# Patient Record
Sex: FEMALE | Race: WHITE | Employment: FULL TIME | URBAN - METROPOLITAN AREA
[De-identification: names, ages, dates, MRNs, and addresses within clinical notes are randomized per-mention and may not be internally consistent; named-entity substitution may affect disease eponyms.]

---

## 2018-05-17 ENCOUNTER — HOSPITAL ENCOUNTER (EMERGENCY)
Age: 33
Discharge: HOME | End: 2018-05-17
Attending: EMERGENCY MEDICINE
Payer: COMMERCIAL

## 2018-05-17 VITALS
HEIGHT: 61 IN | OXYGEN SATURATION: 97 % | HEART RATE: 86 BPM | WEIGHT: 127 LBS | SYSTOLIC BLOOD PRESSURE: 122 MMHG | DIASTOLIC BLOOD PRESSURE: 81 MMHG | BODY MASS INDEX: 23.98 KG/M2 | RESPIRATION RATE: 20 BRPM | TEMPERATURE: 98.9 F
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
## 2018-05-17 DIAGNOSIS — M54.30 SCIATICA, UNSPECIFIED LATERALITY: Primary | ICD-10-CM

## 2018-05-17 PROCEDURE — 74011636637 HC RX REV CODE- 636/637: Performed by: EMERGENCY MEDICINE

## 2018-05-17 PROCEDURE — 99283 EMERGENCY DEPT VISIT LOW MDM: CPT | Performed by: EMERGENCY MEDICINE

## 2018-05-17 PROCEDURE — 96372 THER/PROPH/DIAG INJ SC/IM: CPT | Performed by: EMERGENCY MEDICINE

## 2018-05-17 PROCEDURE — 74011250637 HC RX REV CODE- 250/637: Performed by: EMERGENCY MEDICINE

## 2018-05-17 PROCEDURE — 74011250636 HC RX REV CODE- 250/636: Performed by: EMERGENCY MEDICINE

## 2018-05-17 RX ORDER — PREDNISONE 20 MG/1
60 TABLET ORAL
Status: COMPLETED | OUTPATIENT
Start: 2018-05-17 | End: 2018-05-17

## 2018-05-17 RX ORDER — HYDROMORPHONE HYDROCHLORIDE 2 MG/ML
1 INJECTION, SOLUTION INTRAMUSCULAR; INTRAVENOUS; SUBCUTANEOUS
Status: COMPLETED | OUTPATIENT
Start: 2018-05-17 | End: 2018-05-17

## 2018-05-17 RX ORDER — PROMETHAZINE HYDROCHLORIDE 25 MG/ML
25 INJECTION, SOLUTION INTRAMUSCULAR; INTRAVENOUS
Status: COMPLETED | OUTPATIENT
Start: 2018-05-17 | End: 2018-05-17

## 2018-05-17 RX ORDER — PREDNISONE 20 MG/1
60 TABLET ORAL DAILY
Qty: 15 TAB | Refills: 0 | Status: SHIPPED | OUTPATIENT
Start: 2018-05-17 | End: 2018-05-22

## 2018-05-17 RX ORDER — IBUPROFEN 800 MG/1
800 TABLET ORAL
Status: COMPLETED | OUTPATIENT
Start: 2018-05-17 | End: 2018-05-17

## 2018-05-17 RX ORDER — NAPROXEN SODIUM 550 MG/1
550 TABLET ORAL 2 TIMES DAILY WITH MEALS
Qty: 20 TAB | Refills: 0 | Status: SHIPPED | OUTPATIENT
Start: 2018-05-17 | End: 2018-06-18 | Stop reason: ALTCHOICE

## 2018-05-17 RX ORDER — TRAMADOL HYDROCHLORIDE 50 MG/1
50-100 TABLET ORAL
Qty: 20 TAB | Refills: 0 | Status: SHIPPED | OUTPATIENT
Start: 2018-05-17 | End: 2018-06-22 | Stop reason: ALTCHOICE

## 2018-05-17 RX ORDER — CYCLOBENZAPRINE HCL 10 MG
10 TABLET ORAL
Status: COMPLETED | OUTPATIENT
Start: 2018-05-17 | End: 2018-05-17

## 2018-05-17 RX ORDER — GABAPENTIN 300 MG/1
CAPSULE ORAL
Qty: 90 CAP | Refills: 1 | Status: SHIPPED | OUTPATIENT
Start: 2018-05-17 | End: 2018-06-18 | Stop reason: ALTCHOICE

## 2018-05-17 RX ORDER — METHOCARBAMOL 500 MG/1
500 TABLET, FILM COATED ORAL 4 TIMES DAILY
Qty: 20 TAB | Refills: 0 | Status: SHIPPED | OUTPATIENT
Start: 2018-05-17

## 2018-05-17 RX ADMIN — CYCLOBENZAPRINE HYDROCHLORIDE 10 MG: 10 TABLET, FILM COATED ORAL at 19:10

## 2018-05-17 RX ADMIN — IBUPROFEN 800 MG: 800 TABLET ORAL at 19:10

## 2018-05-17 RX ADMIN — PROMETHAZINE HYDROCHLORIDE 25 MG: 25 INJECTION INTRAMUSCULAR; INTRAVENOUS at 19:09

## 2018-05-17 RX ADMIN — PREDNISONE 60 MG: 20 TABLET ORAL at 19:09

## 2018-05-17 RX ADMIN — HYDROMORPHONE HYDROCHLORIDE 1 MG: 2 INJECTION, SOLUTION INTRAMUSCULAR; INTRAVENOUS; SUBCUTANEOUS at 19:09
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
## 2018-05-17 NOTE — LETTER
[facility] EMERGENCY DEPT 
[address] [place] [id number] [phone] Work/School Note Date: 5/17/2018 To Whom It May concern: 
 
Etta Diaz was seen and treated today in the emergency room by the following provider(s): 
Attending Provider: Terrance Soriano MD.   
 
Etta Diaz may return to work on 5/21, Please excuse Friday, Saturday, and/or Sunday as needed. Working from a standing position would be preferable to sitting.  
 
Sincerely, 
 
 
 
 
Terrance Soriano MD
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
## 2018-05-17 NOTE — ED NOTES
I have reviewed discharge instructions with the patient. The patient verbalized understanding. Patient left ED via Discharge Method: ambulatory to Home with (insert name of family/friend, self, transport Mother). Opportunity for questions and clarification provided. Patient given 5 scripts. Ultram, prednisone, gabapentin, naproxen, and robaxin        To continue your aftercare when you leave the hospital, you may receive an automated call from our care team to check in on how you are doing. This is a free service and part of our promise to provide the best care and service to meet your aftercare needs.  If you have questions, or wish to unsubscribe from this service please call [phone]. Thank you for Choosing our [facility] Emergency Department.
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
## 2019-03-07 PROBLEM — R26.89 ANTALGIC GAIT: Status: ACTIVE | Noted: 2019-03-07

## 2019-03-07 PROBLEM — T14.8XXA MUSCLE STRAIN: Status: ACTIVE | Noted: 2019-03-07

## 2019-03-07 PROBLEM — M54.41 ACUTE RIGHT-SIDED LOW BACK PAIN WITH RIGHT-SIDED SCIATICA: Status: ACTIVE | Noted: 2019-03-07

## 2019-03-07 PROBLEM — M51.36 DDD (DEGENERATIVE DISC DISEASE), LUMBAR: Status: ACTIVE | Noted: 2019-03-07

## 2022-03-18 PROBLEM — T14.8XXA MUSCLE STRAIN: Status: ACTIVE | Noted: 2019-03-07

## 2022-03-18 PROBLEM — M54.41 ACUTE RIGHT-SIDED LOW BACK PAIN WITH RIGHT-SIDED SCIATICA: Status: ACTIVE | Noted: 2019-03-07

## 2022-03-19 PROBLEM — M51.36 DDD (DEGENERATIVE DISC DISEASE), LUMBAR: Status: ACTIVE | Noted: 2019-03-07

## 2022-03-19 PROBLEM — R26.89 ANTALGIC GAIT: Status: ACTIVE | Noted: 2019-03-07

## 2022-08-01 ENCOUNTER — HOSPITAL ENCOUNTER (EMERGENCY)
Age: 37
Discharge: HOME | End: 2022-08-01
Attending: EMERGENCY MEDICINE
Payer: COMMERCIAL

## 2022-08-01 VITALS
TEMPERATURE: 98.4 F | DIASTOLIC BLOOD PRESSURE: 93 MMHG | WEIGHT: 130 LBS | RESPIRATION RATE: 16 BRPM | SYSTOLIC BLOOD PRESSURE: 149 MMHG | BODY MASS INDEX: 24.55 KG/M2 | OXYGEN SATURATION: 100 % | HEART RATE: 70 BPM | HEIGHT: 61 IN

## 2022-08-01 DIAGNOSIS — U07.1 COVID-19: Primary | ICD-10-CM

## 2022-08-01 LAB
ALBUMIN SERPL-MCNC: 3.4 G/DL (ref 3.5–5)
ALBUMIN/GLOB SERPL: 0.9 {RATIO} (ref 1.2–3.5)
ALP SERPL-CCNC: 81 U/L (ref 50–130)
ALT SERPL-CCNC: 22 U/L (ref 12–65)
ANION GAP SERPL CALC-SCNC: 3 MMOL/L (ref 7–16)
APPEARANCE UR: CLEAR
AST SERPL-CCNC: 16 U/L (ref 15–37)
BASOPHILS # BLD: 0.1 K/UL (ref 0–0.2)
BASOPHILS NFR BLD: 1 % (ref 0–2)
BILIRUB SERPL-MCNC: 0.3 MG/DL (ref 0.2–1.1)
BILIRUB UR QL: NEGATIVE
BUN SERPL-MCNC: 8 MG/DL (ref 6–23)
CALCIUM SERPL-MCNC: 9.5 MG/DL (ref 8.3–10.4)
CHLORIDE SERPL-SCNC: 103 MMOL/L (ref 98–107)
CO2 SERPL-SCNC: 30 MMOL/L (ref 21–32)
COLOR UR: NORMAL
CREAT SERPL-MCNC: 0.93 MG/DL (ref 0.6–1)
DIFFERENTIAL METHOD BLD: ABNORMAL
EOSINOPHIL # BLD: 0.3 K/UL (ref 0–0.8)
EOSINOPHIL NFR BLD: 3 % (ref 0.5–7.8)
ERYTHROCYTE [DISTWIDTH] IN BLOOD BY AUTOMATED COUNT: 12.3 % (ref 11.9–14.6)
GLOBULIN SER CALC-MCNC: 3.9 G/DL (ref 2.3–3.5)
GLUCOSE SERPL-MCNC: 87 MG/DL (ref 65–100)
GLUCOSE UR STRIP.AUTO-MCNC: NEGATIVE MG/DL
HCG UR QL: NEGATIVE
HCT VFR BLD AUTO: 44 % (ref 35.8–46.3)
HGB BLD-MCNC: 14.5 G/DL (ref 11.7–15.4)
HGB UR QL STRIP: NEGATIVE
IMM GRANULOCYTES # BLD AUTO: 0 K/UL (ref 0–0.5)
IMM GRANULOCYTES NFR BLD AUTO: 0 % (ref 0–5)
KETONES UR QL STRIP.AUTO: NEGATIVE MG/DL
LEUKOCYTE ESTERASE UR QL STRIP.AUTO: NEGATIVE
LYMPHOCYTES # BLD: 2.7 K/UL (ref 0.5–4.6)
LYMPHOCYTES NFR BLD: 28 % (ref 13–44)
MCH RBC QN AUTO: 30 PG (ref 26.1–32.9)
MCHC RBC AUTO-ENTMCNC: 33 G/DL (ref 31.4–35)
MCV RBC AUTO: 91.1 FL (ref 79.6–97.8)
MONOCYTES # BLD: 0.6 K/UL (ref 0.1–1.3)
MONOCYTES NFR BLD: 6 % (ref 4–12)
NEUTS SEG # BLD: 6.1 K/UL (ref 1.7–8.2)
NEUTS SEG NFR BLD: 62 % (ref 43–78)
NITRITE UR QL STRIP.AUTO: NEGATIVE
NRBC # BLD: 0 K/UL (ref 0–0.2)
PH UR STRIP: 7.5 [PH] (ref 5–9)
PLATELET # BLD AUTO: 280 K/UL (ref 150–450)
PMV BLD AUTO: 8.4 FL (ref 9.4–12.3)
POTASSIUM SERPL-SCNC: 4.6 MMOL/L (ref 3.5–5.1)
PROT SERPL-MCNC: 7.3 G/DL (ref 6.3–8.2)
PROT UR STRIP-MCNC: NEGATIVE MG/DL
RBC # BLD AUTO: 4.83 M/UL (ref 4.05–5.2)
SARS-COV-2 RDRP RESP QL NAA+PROBE: DETECTED
SODIUM SERPL-SCNC: 136 MMOL/L (ref 136–145)
SOURCE: ABNORMAL
SP GR UR REFRACTOMETRY: 1.01 (ref 1–1.02)
UROBILINOGEN UR QL STRIP.AUTO: 0.2 EU/DL (ref 0.2–1)
WBC # BLD AUTO: 9.8 K/UL (ref 4.3–11.1)

## 2022-08-01 PROCEDURE — 85025 COMPLETE CBC W/AUTO DIFF WBC: CPT

## 2022-08-01 PROCEDURE — 96360 HYDRATION IV INFUSION INIT: CPT

## 2022-08-01 PROCEDURE — 80053 COMPREHEN METABOLIC PANEL: CPT

## 2022-08-01 PROCEDURE — 99284 EMERGENCY DEPT VISIT MOD MDM: CPT

## 2022-08-01 PROCEDURE — 81025 URINE PREGNANCY TEST: CPT

## 2022-08-01 PROCEDURE — 87635 SARS-COV-2 COVID-19 AMP PRB: CPT

## 2022-08-01 PROCEDURE — 2580000003 HC RX 258

## 2022-08-01 PROCEDURE — 81003 URINALYSIS AUTO W/O SCOPE: CPT

## 2022-08-01 RX ORDER — 0.9 % SODIUM CHLORIDE 0.9 %
1000 INTRAVENOUS SOLUTION INTRAVENOUS ONCE
Status: COMPLETED | OUTPATIENT
Start: 2022-08-01 | End: 2022-08-01

## 2022-08-01 RX ORDER — CLONAZEPAM 0.5 MG/1
0.5 TABLET ORAL DAILY
COMMUNITY

## 2022-08-01 RX ORDER — ESCITALOPRAM OXALATE 20 MG/1
35 TABLET ORAL DAILY
COMMUNITY

## 2022-08-01 RX ORDER — VENLAFAXINE 75 MG/1
75 TABLET ORAL 2 TIMES DAILY
COMMUNITY

## 2022-08-01 RX ADMIN — SODIUM CHLORIDE 1000 ML: 9 INJECTION, SOLUTION INTRAVENOUS at 11:03

## 2022-08-01 ASSESSMENT — ENCOUNTER SYMPTOMS
COLOR CHANGE: 0
DIARRHEA: 0
NAUSEA: 1
SHORTNESS OF BREATH: 0
ABDOMINAL PAIN: 0
VOMITING: 0

## 2022-08-01 NOTE — ED NOTES
I have reviewed discharge instructions with the patient. The patient verbalized understanding. Patient left ED via Discharge Method: wheelchair to Home with . Opportunity for questions and clarification provided. Patient given 0 scripts. To continue your aftercare when you leave the hospital, you may receive an automated call from our care team to check in on how you are doing. This is a free service and part of our promise to provide the best care and service to meet your aftercare needs.  If you have questions, or wish to unsubscribe from this service please call [phone]. Thank you for Choosing our [facility] Emergency Department.       Ralph Cuevas RN  08/01/22 0110

## 2022-08-01 NOTE — ED PROVIDER NOTES
[facility] Emergency Department Provider Note                   PCP:                Zack Neal MD               Age: 29 y.o. Sex: female       ICD-10-CM    1. COVID-19  U5.3           DISPOSITION Decision To Discharge 08/01/2022 11:55:56 AM        MDM  Number of Diagnoses or Management Options  COVID-19  Diagnosis management comments: Vital signs reviewed, patient stable, NAD, afebrile, nontoxic in appearance     Will obtain CBC, CMP, urinalysis, urine hCG, rapid COVID-19    Will administer 1 L normal saline  Patient declined IM Toradol    No Elevated white count on CBC  No concerning findings on CMP  Urine hCG negative  Urinalysis grossly normal  Rapid COVID-19 positive    Exam is benign. No findings on neuro exam, lungs are clear to auscultation bilaterally, abdomen is soft nontender, no posterior oropharynx erythema, tonsillar exudate. Based On history and physical exam, I do not feel any further lab work or any imaging is warranted at this time. I discussed physical exam findings, laboratory and/or imaging findings, treatment and follow-up with the patient and those who were present. I answered any questions they had. They verbalized that they understood and were in agreement with treatment and disposition. I discussed signs and symptoms that would warrant a prompt return to the emergency department with the patient. I included the signs and symptoms on discharge paperwork. Patient verbalized that they understood. Discharged home in stable condition. She is to follow-up with her primary care provider next week.        Amount and/or Complexity of Data Reviewed  Clinical lab tests: ordered and reviewed  Review and summarize past medical records: yes  Independent visualization of images, tracings, or specimens: yes    Risk of Complications, Morbidity, and/or Mortality  Presenting problems: moderate  Diagnostic procedures: low  Management options: low    Patient Progress  Patient progress: stable       Orders Placed This Encounter   Procedures    COVID-19, Rapid    Urinalysis w rflx microscopic    Pregnancy, Urine    Comprehensive Metabolic Panel    CBC with Auto Differential    Misc nursing order (specify)        Mal Mena is a 29 y.o. female who presents to the Emergency Department with chief complaint of    Chief Complaint   Patient presents with    Dizziness    Nausea    Fatigue      52-year-old female with history of anxiety disorder, mixed hyperlipidemia, DDD, antalgic gait [facility] to the emergency department today with chief complaint of feeling weak while at work today. Patient states she got very hot and flushed and she went down to her knees and felt somewhat nauseous. Patient denies falling and striking her head, current nausea, chest pain, shortness of breath, vomiting or diarrhea, sore throat or ear pain, headache. States she had a mild headache earlier but it has resolved since receiving IV fluids. Nothing makes patient's condition better. Nothing makes patient's condition worse. No treatments tried. The history is provided by the patient. No  was used. Review of Systems   Constitutional:  Positive for fatigue. Negative for chills and fever. Respiratory:  Negative for shortness of breath. Cardiovascular:  Negative for chest pain and palpitations. Gastrointestinal:  Positive for nausea. Negative for abdominal pain, diarrhea and vomiting. Skin:  Negative for color change. Neurological:  Positive for headaches. Negative for weakness. All other systems reviewed and are negative. History reviewed. No pertinent past medical history. History reviewed. No pertinent surgical history. History reviewed. No pertinent family history.      Social History     Socioeconomic History    Marital status:      Spouse name: None    Number of children: None    Years of education: None    Highest education level: None Patient has no known allergies. Discharge Medication List as of 8/1/2022 12:02 PM        CONTINUE these medications which have NOT CHANGED    Details   venlafaxine (EFFEXOR) 75 MG tablet Take 75 mg by mouth in the morning and 75 mg before bedtime. Historical Med      escitalopram (LEXAPRO) 20 MG tablet Take 35 mg by mouth in the morning. Historical Med      clonazePAM (KLONOPIN) 0.5 MG tablet Take 0.5 mg by mouth in the morning. Historical Med              Vitals signs and nursing note reviewed. Patient Vitals for the past 4 hrs:   Temp Pulse Resp BP SpO2   08/01/22 1155 -- 70 16 (!) 149/93 100 %   08/01/22 1130 -- 73 16 128/66 100 %   08/01/22 1033 98.4 °F (36.9 °C) 71 20 (!) 152/103 98 %          Physical Exam  Vitals and nursing note reviewed. Constitutional:       General: She is not in acute distress. Appearance: Normal appearance. She is normal weight. She is not ill-appearing, toxic-appearing or diaphoretic. HENT:      Head: Normocephalic and atraumatic. Right Ear: Tympanic membrane, ear canal and external ear normal.      Left Ear: Tympanic membrane, ear canal and external ear normal.      Nose: Nose normal.      Mouth/Throat:      Mouth: Mucous membranes are moist.      Pharynx: Oropharynx is clear. No oropharyngeal exudate or posterior oropharyngeal erythema. Eyes:      General: No visual field deficit or scleral icterus. Extraocular Movements: Extraocular movements intact. Conjunctiva/sclera: Conjunctivae normal.      Pupils: Pupils are equal, round, and reactive to light. Cardiovascular:      Rate and Rhythm: Normal rate. Pulses: Normal pulses. Heart sounds: Normal heart sounds. Pulmonary:      Effort: Pulmonary effort is normal. No respiratory distress. Breath sounds: Normal breath sounds. No stridor. No wheezing, rhonchi or rales. Chest:      Chest wall: No tenderness. Abdominal:      General: Bowel sounds are normal.      Palpations: Abdomen is soft. Tenderness: There is no abdominal tenderness. There is no guarding or rebound. Musculoskeletal:         General: Normal range of motion. Cervical back: Normal range of motion and neck supple. No rigidity or tenderness. Right lower leg: No edema. Left lower leg: No edema. Lymphadenopathy:      Cervical: No cervical adenopathy. Skin:     General: Skin is warm and dry. Capillary Refill: Capillary refill takes less than 2 seconds. Coloration: Skin is not jaundiced or pale. Findings: No bruising, erythema, lesion or rash. Neurological:      General: No focal deficit present. Mental Status: She is alert and oriented to person, place, and time. GCS: GCS eye subscore is 4. GCS verbal subscore is 5. GCS motor subscore is 6. Cranial Nerves: Cranial nerves are intact. No cranial nerve deficit, dysarthria or facial asymmetry. Sensory: Sensation is intact. No sensory deficit. Motor: No weakness, tremor, atrophy, abnormal muscle tone, seizure activity or pronator drift. Coordination: Coordination is intact. Coordination normal. Finger-Nose-Finger Test and Heel to [facility] Test normal.   Psychiatric:         Mood and Affect: Mood normal.         Behavior: Behavior normal.         Thought Content:  Thought content normal.         Judgment: Judgment normal.        Procedures      Labs Reviewed   COVID-19, RAPID - Abnormal; Notable for the following components:       Result Value    SARS-CoV-2, Rapid Detected (*)     All other components within normal limits   COMPREHENSIVE METABOLIC PANEL - Abnormal; Notable for the following components:    Anion Gap 3 (*)     Albumin 3.4 (*)     Globulin 3.9 (*)     Albumin/Globulin Ratio 0.9 (*)     All other components within normal limits   CBC WITH AUTO DIFFERENTIAL - Abnormal; Notable for the following components:    MPV 8.4 (*)     All other components within normal limits   URINALYSIS   PREGNANCY, URINE        No orders to display                    ED Course as of 08/01/22 1216   Mon Aug 01, 2022   1148 Comprehensive Metabolic Panel(!):    Sodium 136   Potassium 4.6   Chloride 103   CO2 30   Anion Gap 3(!)   GLUCOSE, FASTING,GF 87   BUN,BUNPL 8   Creatinine 0.93   GFR African American >60   GFR Non- >60   CALCIUM, SERUM, 093056 9.5   Bilirubin 0.3   ALT 22   AST 16   Alk Phosphatase 81   Total Protein 7.3   Albumin 3.4(!)   Globulin 3.9(!)   ALBUMIN/GLOBULIN RATIO 0.9(!) [JG]   1148 CBC with Auto Differential(!):    WBC 9.8   RBC 4.83   Hemoglobin Quant 14.5   Hematocrit 44.0   MCV 91.1   MCH 30.0   MCHC 33.0   RDW 12.3   Platelet Count 718   MPV 8.4(!)   Nucleated Red Blood Cells 0.00   Differential Type AUTOMATED   Seg Neutrophils 62   Lymphocytes 28   Monocytes 6   Eosinophils % 3   Basophils 1   Immature Granulocytes 0   Segs Absolute 6.1   Absolute Lymph # 2.7   Absolute Mono # 0.6   Absolute Eos # 0.3   Basophils Absolute 0.1   Absolute Immature Granulocyte 0.0 [JG]   1148 COVID-19, Rapid(!!):    SOURCE, [id number] Nasopharyngeal   SARS-CoV-2, Rapid Detected(!!) [JG]   1148 Urinalysis w rflx microscopic:    Color, UA YELLOW/STRAW   Appearance CLEAR   Specific Gravity, UA 1.007   pH, Urine 7.5   Protein, UA Negative   Glucose, UA Negative   Ketones, Urine Negative   Bilirubin, Urine Negative   Blood, Urine Negative   Urobilinogen, Urine 0.2   Nitrite, Urine Negative   Leukocyte Esterase, Urine Negative [JG]   1148 Pregnancy, Urine:    HCG(Urine) Pregnancy Test Negative [JG]   1153 Patient states she does not want any Toradol for body aches and headache. [JG]      ED Course User Index  [JG] Krystle Parker PA        Voice dictation software was used during the making of this note. This software is not perfect and grammatical and other typographical errors may be present. This note has not been completely proofread for errors.       [place], [place]  08/01/22 1216

## 2022-08-01 NOTE — Clinical Note
Julio Quintanilla was seen and treated in our emergency department on 8/1/2022. COVID19 virus is suspected. Per the CDC guidelines we recommend home isolation until the following conditions are all met:    1. At least five days have passed since symptoms first appeared and/or had a close exposure,   2. After home isolation for five days, wearing a mask around others for the next five days,  3. At least 24 have passed since last fever without the use of fever-reducing medications and  4. Symptoms (eg cough, shortness of breath) have improved    If you have any questions or concerns, please don't hesitate to call.     She may return to work/school on 08/06/2022        Katie Arenas

## 2022-08-01 NOTE — DISCHARGE INSTRUCTIONS
Evaluated in the emergency department today for weakness, nausea, flushed feeling and headache    Lab work today is very reassuring  You did test positive for COVID-19    Treatment is symptomatic care such as pushing fluids, rest, fever reduction with Tylenol, NSAID use for muscle aches and headache    Please contact your primary care provider to schedule follow-up within the next week     return to the emergency department if you have fevers that do not reduce with Tylenol, chest pain, shortness of breath, signs and symptoms of stroke as listed in your discharge paperwork or just general worsening of your condition

## 2022-08-01 NOTE — Clinical Note
Finley Councilman was seen and treated in our emergency department on 8/1/2022. COVID19 virus is suspected. Per the CDC guidelines we recommend home isolation until the following conditions are all met:    1. At least five days have passed since symptoms first appeared and/or had a close exposure,   2. After home isolation for five days, wearing a mask around others for the next five days,  3. At least 24 have passed since last fever without the use of fever-reducing medications and  4. Symptoms (eg cough, shortness of breath) have improved    If you have any questions or concerns, please don't hesitate to call.     She may return to work/school on 08/06/2022        Ligia Edwards [place]

## 2022-08-01 NOTE — Clinical Note
Richardson Saenz was seen and treated in our emergency department on 8/1/2022. COVID19 virus is suspected. Per the CDC guidelines we recommend home isolation until the following conditions are all met:    1. At least five days have passed since symptoms first appeared and/or had a close exposure,   2. After home isolation for five days, wearing a mask around others for the next five days,  3. At least 24 have passed since last fever without the use of fever-reducing medications and  4. Symptoms (eg cough, shortness of breath) have improved    If you have any questions or concerns, please don't hesitate to call.     She may return to work/school on 08/06/2022        Katie Merlos

## 2022-08-01 NOTE — ED TRIAGE NOTES
Pt presents to the ED via EMS coming from work where she was experiencing dizziness, nausea, and fatigue. Pt states she did not have a syncopal episode to her knowledge.

## 2022-08-02 ENCOUNTER — CARE COORDINATION (OUTPATIENT)
Dept: CARE COORDINATION | Facility: CLINIC | Age: 37
End: 2022-08-02

## 2022-08-02 NOTE — CARE COORDINATION
Patient contacted regarding COVID-19 diagnosis. Discussed COVID-19 related testing which was available at this time. Test results were positive. Patient informed of results, if available? Yes. LPN Care Coordinator contacted the patient by telephone to perform post discharge assessment. Call within 2 business days of discharge: Yes. Verified name and  with patient as identifiers. Provided introduction to self, and explanation of the CTN/ACM role, and reason for call due to risk factors for infection and/or exposure to COVID-19. Symptoms reviewed with patient who verbalized the following symptoms: fatigue. Due to no new or worsening symptoms encounter was not routed to provider for escalation. Discussed follow-up appointments. If no appointment was previously scheduled, appointment scheduling offered: Patient will contact pcp to schedule f/u. [facility] follow up appointment(s): No future appointments. Non-face-to-face services provided:  Obtained and reviewed discharge summary and/or continuity of care documents  Education of patient/family/caregiver/guardian to support self-management-covid symptoms     Advance Care Planning:   Does patient have an Advance Directive:  decision maker updated. Educated patient about risk for severe COVID-19 due to risk factors according to CDC guidelines. LPN CC reviewed discharge instructions, medical action plan and red flag symptoms with the patient who verbalized understanding. Discussed COVID vaccination status: Yes. Education provided on COVID-19 vaccination as appropriate. Discussed exposure protocols and quarantine with CDC Guidelines. Patient was given an opportunity to verbalize any questions and concerns and agrees to contact LPN CC or health care provider for questions related to their healthcare. Reviewed and educated patient on any new and changed medications related to discharge diagnosis     Was patient discharged with a pulse oximeter? no      LPN CC provided contact information. Plan for follow-up call in 5-7 days based on severity of symptoms and risk factors.

## 2022-08-09 ENCOUNTER — CARE COORDINATION (OUTPATIENT)
Dept: CARE COORDINATION | Facility: CLINIC | Age: 37
End: 2022-08-09